# Patient Record
Sex: FEMALE | Race: WHITE | HISPANIC OR LATINO | Employment: FULL TIME | URBAN - METROPOLITAN AREA
[De-identification: names, ages, dates, MRNs, and addresses within clinical notes are randomized per-mention and may not be internally consistent; named-entity substitution may affect disease eponyms.]

---

## 2019-03-18 ENCOUNTER — OFFICE VISIT (OUTPATIENT)
Dept: URGENT CARE | Facility: PHYSICIAN GROUP | Age: 19
End: 2019-03-18
Payer: COMMERCIAL

## 2019-03-18 VITALS
SYSTOLIC BLOOD PRESSURE: 104 MMHG | TEMPERATURE: 98.6 F | HEIGHT: 65 IN | HEART RATE: 79 BPM | OXYGEN SATURATION: 98 % | BODY MASS INDEX: 20.16 KG/M2 | DIASTOLIC BLOOD PRESSURE: 68 MMHG | RESPIRATION RATE: 18 BRPM | WEIGHT: 121 LBS

## 2019-03-18 DIAGNOSIS — K59.00 CONSTIPATION, UNSPECIFIED CONSTIPATION TYPE: ICD-10-CM

## 2019-03-18 DIAGNOSIS — J11.1 INFLUENZA: ICD-10-CM

## 2019-03-18 DIAGNOSIS — R68.89 FLU-LIKE SYMPTOMS: ICD-10-CM

## 2019-03-18 LAB
FLUAV+FLUBV AG SPEC QL IA: NORMAL
INT CON NEG: NEGATIVE
INT CON POS: POSITIVE

## 2019-03-18 PROCEDURE — 99203 OFFICE O/P NEW LOW 30 MIN: CPT | Performed by: PHYSICIAN ASSISTANT

## 2019-03-18 PROCEDURE — 87804 INFLUENZA ASSAY W/OPTIC: CPT | Performed by: PHYSICIAN ASSISTANT

## 2019-03-18 RX ORDER — FLUTICASONE PROPIONATE 50 MCG
1 SPRAY, SUSPENSION (ML) NASAL DAILY
Qty: 16 G | Refills: 0 | Status: SHIPPED | OUTPATIENT
Start: 2019-03-18 | End: 2023-04-25

## 2019-03-18 RX ORDER — OSELTAMIVIR PHOSPHATE 75 MG/1
75 CAPSULE ORAL 2 TIMES DAILY
Qty: 10 CAP | Refills: 0 | Status: SHIPPED | OUTPATIENT
Start: 2019-03-18 | End: 2019-03-23

## 2019-03-18 ASSESSMENT — ENCOUNTER SYMPTOMS
NAUSEA: 0
SORE THROAT: 1
MYALGIAS: 1
COUGH: 1
ABDOMINAL PAIN: 1
FATIGUE: 1
CHILLS: 1
VOMITING: 0
FEVER: 1

## 2019-03-18 NOTE — PROGRESS NOTES
"Subjective:   Dinora Green is a 19 y.o. female who presents for Cough (congestion, sore throat, stomach pain, fever, headache, X 3 days )        5 AM last dose of ibuprofen.  LMP: 2/25/19 (approximately). She is not sexually active, denies chance that she could be pregnant.  Last BM: Friday - feels constipated, but has not taken any medications for this.        Influenza   This is a new problem. The current episode started in the past 7 days (3 days). The problem occurs constantly. The problem has been unchanged. Associated symptoms include abdominal pain (mild lower), chills, congestion, coughing, fatigue, a fever (subjective), myalgias and a sore throat. Pertinent negatives include no nausea or vomiting. Nothing aggravates the symptoms. She has tried NSAIDs for the symptoms. The treatment provided moderate relief.     Review of Systems   Constitutional: Positive for chills, fatigue and fever (subjective).   HENT: Positive for congestion and sore throat.    Respiratory: Positive for cough.    Gastrointestinal: Positive for abdominal pain (mild lower). Negative for nausea and vomiting.   Musculoskeletal: Positive for myalgias.       PMH:  has no past medical history on file.  MEDS:   Current Outpatient Prescriptions:   •  oseltamivir (TAMIFLU) 75 MG Cap, Take 1 Cap by mouth 2 times a day for 5 days., Disp: 10 Cap, Rfl: 0  •  fluticasone (FLONASE) 50 MCG/ACT nasal spray, Spray 1 Spray in nose every day., Disp: 16 g, Rfl: 0  ALLERGIES: No Known Allergies  SURGHX: No past surgical history on file.  SOCHX:  reports that she has never smoked. She has never used smokeless tobacco.  FH: Family history was reviewed, no pertinent findings to report   Objective:   /68   Pulse 79   Temp 37 °C (98.6 °F)   Resp 18   Ht 1.651 m (5' 5\")   Wt 54.9 kg (121 lb)   SpO2 98%   BMI 20.14 kg/m²   Physical Exam   Constitutional: She appears well-developed and well-nourished.  Non-toxic appearance. No distress.   HENT: "   Head: Normocephalic and atraumatic.   Right Ear: Tympanic membrane, external ear and ear canal normal.   Left Ear: Tympanic membrane, external ear and ear canal normal.   Nose: Mucosal edema and rhinorrhea present.   Mouth/Throat: Uvula is midline and mucous membranes are normal. Posterior oropharyngeal erythema present.   Significant nasal congestion.  Occasional dry cough.   Neck: Neck supple.   Cardiovascular: Normal rate, regular rhythm and normal heart sounds.  Exam reveals no gallop and no friction rub.    No murmur heard.  Pulmonary/Chest: Effort normal and breath sounds normal. No respiratory distress. She has no wheezes. She has no rales.   Abdominal: Soft. Bowel sounds are normal. There is no hepatosplenomegaly. There is tenderness (Very mild diffuse tenderness.). There is no rigidity, no rebound, no guarding, no CVA tenderness, no tenderness at McBurney's point and negative Castro's sign.   Neurological: She is alert.   Skin: Skin is warm and dry. Capillary refill takes less than 2 seconds.   Psychiatric: She has a normal mood and affect. Her speech is normal and behavior is normal. Judgment and thought content normal. Cognition and memory are normal.   Vitals reviewed.        Assessment/Plan:   1. Influenza  - oseltamivir (TAMIFLU) 75 MG Cap; Take 1 Cap by mouth 2 times a day for 5 days.  Dispense: 10 Cap; Refill: 0  - fluticasone (FLONASE) 50 MCG/ACT nasal spray; Spray 1 Spray in nose every day.  Dispense: 16 g; Refill: 0    2. Flu-like symptoms  - POCT Influenza A/B    3. Constipation, unspecified constipation type      1.  Pt presentation consistent with influenza, high prevalence in community currently, and clinical suspicion is high.    VSS, no dyspnea, no SOB, and lungs CTA on PE.  Goals of care include symptomatic control and prevention of lower respiratory spread. Signs of lower respiratory involvement discussed with pt.  Pt instructed to RTC if any of these are observed.     Drink plenty of  fluids and rest.  Use nasal saline TID to promote drainage.   Salt water gurgles to soothe sore throat.  Start OTC expectorant.  APAP for fever control, and NSAIDs for throat pain/headache relief prn.    If you fail to improve in 24 days or symptoms worsen/new symptoms develop, RTC for reevaluation.    If symptoms worsen or new symptoms develop to include uncontrolled fevers, dyspnea, SOB, or vomiting- pt was instructed to go to the ED for evaluation.      2.  Patient has a very mild generalized abdominal tenderness.  No nausea or vomiting.  Patient symptoms likely secondary to constipation, as she has not had a bowel movement in 3 days.  Patient started on polyethylene glycol.  I would like her to take 2 capfuls of MiraLAX twice daily for the next 7 days then take half a capful of MiraLAX twice daily for 7 days followed by half a capful of MiraLAX once a day for 7 days.  If patient fails to have a bowel movement in the next 1-2 days I would like her to return to clinic for reevaluation.  If patient develops nausea, vomiting, fevers, chills, worsening abdominal pain, blood in stools or other new symptoms I would like her to go to the emergency room for reevaluation.  I informed her that although constipation is most likely the cause of her symptoms it is possible that a different pathology is responsible for her symptoms but it has not fully expressed itself yet. That being said if she is getting worse or develop new symptoms she needs to be reevaluated.  Differential diagnosis, natural history, supportive care, and indications for immediate follow-up discussed.

## 2019-03-18 NOTE — LETTER
[facility] [place]  [address]  [place] [address]     March 18, 2019    Patient: Dinora Green   YOB: 2000   Date of Visit: 3/18/2019       To Whom It May Concern:    Dinora Green was seen and treated in our department on 3/18/2019.  Please excuse her from work on 3/18/19.    Sincerely,     Jerry Cheng

## 2019-08-21 ENCOUNTER — OCCUPATIONAL MEDICINE (OUTPATIENT)
Dept: URGENT CARE | Facility: PHYSICIAN GROUP | Age: 19
End: 2019-08-21
Payer: COMMERCIAL

## 2019-08-21 ENCOUNTER — APPOINTMENT (OUTPATIENT)
Dept: RADIOLOGY | Facility: IMAGING CENTER | Age: 19
End: 2019-08-21
Attending: NURSE PRACTITIONER
Payer: COMMERCIAL

## 2019-08-21 VITALS
TEMPERATURE: 98.6 F | OXYGEN SATURATION: 97 % | RESPIRATION RATE: 16 BRPM | SYSTOLIC BLOOD PRESSURE: 100 MMHG | BODY MASS INDEX: 21.83 KG/M2 | DIASTOLIC BLOOD PRESSURE: 68 MMHG | HEIGHT: 65 IN | HEART RATE: 74 BPM | WEIGHT: 131 LBS

## 2019-08-21 DIAGNOSIS — M79.672 ACUTE PAIN OF LEFT FOOT: ICD-10-CM

## 2019-08-21 DIAGNOSIS — S96.912A REPETITIVE STRAIN INJURY OF LEFT FOOT, INITIAL ENCOUNTER: ICD-10-CM
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
## 2019-08-21 PROCEDURE — 73630 X-RAY EXAM OF FOOT: CPT | Mod: TC,LT | Performed by: NURSE PRACTITIONER

## 2019-08-21 PROCEDURE — 99213 OFFICE O/P EST LOW 20 MIN: CPT | Performed by: NURSE PRACTITIONER

## 2019-08-21 NOTE — LETTER
"EMPLOYEE’S CLAIM FOR COMPENSATION/ REPORT OF INITIAL TREATMENT  FORM C-4    EMPLOYEE’S CLAIM - PROVIDE ALL INFORMATION REQUESTED   First Name  Dinora Last Name  Peter Birthdate                    2000                Sex  female Claim Number   Home Address  [address] Age  19 y.o. 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 (if applicable)  Going up and down the ladder    How did this injury or occupational disease occur? (Be specific an answer in detail. Use additional sheet if necessary)  Going up and down the ladder to get product   If you believe that you have an occupational disease, when did you first have knowledge of the disability and it relationship to your employment?  N/A Witnesses to the Accident  N/A      Nature of Injury or Occupational Disease  Sprain  Part(s) of Body Injured or Affected  Foot (L), N/A, N/A    I certify that the above is true and correct to the best of my knowledge and that I have provided this information in order to obtain the benefits of [place] inclusive or [place]).  I hereby authorize any physician, chiropractor, surgeon, practitioner, or other person, any hospital, including [facility] hospital, any medical service organization, any insurance company, or other institution or organization to release to each other, any medical or other information, including benefits paid or payable, pertinent to this injury or disease, except information relative to diagnosis, treatment and/or counseling for AIDS, psychological conditions, alcohol or controlled substances, for which I must give specific authorization.  A Photostat of this authorization shall be as valid as the original.     Date   Place   Employee’s Signature   THIS REPORT MUST BE COMPLETED AND MAILED WITHIN 3 WORKING DAYS OF TREATMENT   Place  [facility]  Name of Facility  [place]   Date  8/21/2019 Diagnosis  (S96.912A) Repetitive strain injury of left foot, initial encounter Is there evidence the injured employee was under the influence of alcohol and/or another controlled substance at the time of accident?   Hour  10:27 AM Description of Injury or Disease  The encounter diagnosis was Repetitive strain injury of left foot, initial encounter. No   Treatment  OTC NSAIDS, supportive foot wear, RICE, work restrictions, RTC in four days for re-eval.  Have you advised the patient to remain off work five days or more? No   X-Ray Findings  Negative   If Yes   From Date  To Date      From information given by the employee, together with medical evidence, can you directly connect this injury or occupational disease as job incurred?  Yes If No Full Duty  No Modified Duty  Yes   Is additional medical care by a physician indicated?  Yes If Modified Duty, Specify any Limitations / Restrictions  Per D-39   Do you know of any previous injury or disease contributing to this condition or occupational disease?                            No   Date  8/21/2019  "Print Doctor’s Name ROSA Hines I certify the employer’s copy of  this form was mailed on:   Address  [address] [place] [address]. [address] Insurer’s Use Only     [facility] Zip  [address]    Provider’s Tax ID Number  [id number] Telephone  Dept: [phone]        e-SUNDAY Chavez   e-Signature: Dr. Onel Wong, Medical Director Degree  MD        ORIGINAL-TREATING PHYSICIAN OR CHIROPRACTOR    PAGE 2-INSURER/TPA    PAGE 3-EMPLOYER    PAGE 4-EMPLOYEE             Form C-4 (rev10/07)              BRIEF DESCRIPTION OF RIGHTS AND BENEFITS  (Pursuant to [place] 616C.050)    Notice of Injury or Occupational Disease (Incident Report Form C-1): If an injury or occupational disease (OD) arises out of and in the  course of employment, you must provide written notice to your employer as soon as practicable, but no later than 7 days after the accident or  OD. Your employer shall maintain a sufficient supply of the required forms.    Claim for Compensation (Form C-4): If medical treatment is sought, the form C-4 is available at the place of initial treatment. A completed  \"Claim for Compensation\" (Form C-4) must be filed within 90 days after an accident or OD. The treating physician or chiropractor must,  within 3 working days after treatment, complete and mail to the employer, the employer's insurer and third-party , the Claim for  Compensation.    Medical Treatment: If you require medical treatment for your on-the-job injury or OD, you may be required to select a physician or  chiropractor from a list provided by your workers’ compensation insurer, if it has contracted with an Organization for Managed Care (MCO) or  Preferred Provider Organization (PPO) or providers of health care. If your employer has not entered into a contract with an MCO or PPO, you  may select a physician or chiropractor from the Panel of Physicians and Chiropractors. Any medical costs related to your industrial injury " or  OD will be paid by your insurer.    Temporary Total Disability (TTD): If your doctor has certified that you are unable to work for a period of at least 5 consecutive days, or 5  cumulative days in a 20-day period, or places restrictions on you that your employer does not accommodate, you may be entitled to TTD  compensation.    Temporary Partial Disability (TPD): If the wage you receive upon reemployment is less than the compensation for TTD to which you are  entitled, the insurer may be required to pay you TPD compensation to make up the difference. TPD can only be paid for a maximum of 24  months.    Permanent Partial Disability (PPD): When your medical condition is stable and there is an indication of a PPD as a result of your injury or  OD, within 30 days, your insurer must arrange for an evaluation by a rating physician or chiropractor to determine the degree of your PPD. The  amount of your PPD award depends on the date of injury, the results of the PPD evaluation and your age and wage.    Permanent Total Disability (PTD): If you are medically certified by a treating physician or chiropractor as permanently and totally disabled  and have been granted a PTD status by your insurer, you are entitled to receive monthly benefits not to exceed 66 2/3% of your average  monthly wage. The amount of your PTD payments is subject to reduction if you previously received a PPD award.    Vocational Rehabilitation Services: You may be eligible for vocational rehabilitation services if you are unable to return to the job due to a  permanent physical impairment or permanent restrictions as a result of your injury or occupational disease.    Transportation and Per Lesly Reimbursement: You may be eligible for travel expenses and per lesly associated with medical treatment.    Reopening: You may be able to reopen your claim if your condition worsens after claim closure.    Appeal Process: If you disagree with a written  determination issued by the insurer or the insurer does not respond to your request, you may  appeal to the Department of Administration, , by following the instructions contained in your determination letter. You must  appeal the determination within 70 days from the date of the determination letter at [address], [address], [place], [place]  [address], or [address], [address], [place], [place] [address]. If you disagree with the  decision, you may appeal to the  Department of Administration, . You must file your appeal within 30 days from the date of the  decision  letter at [address], [address], [place], [place] [address], or [address], [address], [place], [place] [address]. If you  disagree with a decision of an , you may file a petition for judicial review with the District Court. You must do so within 30  days of the Appeal Officer’s decision. You may be represented by an  at your own expense or you may contact the [facility] for possible  representation.    [place]  for Injured Workers (NAIW): If you disagree with a  decision, you may request that NAIW represent you  without charge at an  Hearing. For information regarding denial of benefits, you may contact the [facility] at: [address], [address], [place] [address], [phone], or [address], [place] [address], [phone]    To File a Complaint with the Division: If you wish to file a complaint with the  of the Division of Industrial Relations (DIR),  please contact the Workers’ Compensation Section, [address], [address], [place], [place] [address], telephone [phone]) [phone], or  [address], [address] [place], [place] [address], telephone [phone]) [phone].    For assistance with Workers’ Compensation Issues: you may contact the  Office of the Governor Consumer Health Assistance, [address] [place] [address], [address], [place] [address], Toll Free 1-491.662.5158, Web site: http://govcha.[facility] access hospital.nv., E-mail  Andreia@[facility] access [facility].nv.                                                                                                                                                                                                                                   __________________________________________________________________                                                                   _________________                Employee Name / Signature                                                                                                                                                       Date                                                                                                                                                                                                     D-2 (rev. 10/07)

## 2019-08-21 NOTE — LETTER
"   [facility] [place]  [address] [place] [address]. [address] - [place] [address]  Phone:  [phone] - Fax:  [phone]   [facility] Progress Report and Disability Certification  Date of Service: 8/21/2019   No Show:  No  Date / Time of Next Visit: 8/24/2019   Claim Information   Patient Name: Dinora Green  Claim Number:     Employer:   *** Date of Injury: 8/16/2019     Insurer / TPA: [employer] *** ID / SSN:     Occupation: Pulling/Pick *** Diagnosis: The encounter diagnosis was Repetitive strain injury of left foot, initial encounter.    Medical Information   Related to Industrial Injury? Yes ***   Subjective Complaints:  DOI 8/16/19: Patient works at a [employer], picking products. During her shift, she climbs ladders all day \"picking\" items. She was at work, climbing up a ladder, performing her normal duties, when she suddenly felt pain to her left foot. She did not feel any significant pop or twinge but did feel the pain. The pain is on dorsal aspect of her foot. She has had constant pain since, worse with ambulation. Admits to intermittent tingling sensation to her toes. Denies previous injuries to this foot. She has tried epsom salt soaks for pain relief. Does not have any other jobs or contributing factors.    Objective Findings: A/Ox4. NAD. Left foot: swelling noted to distal aspect of dorsal forefoot. There is tenderness to foot at distal 2nd/3rd metatarsals. N/V intact, FROM. Gait mildly antalgic. No erythema, warmth, ecchymosis.    Pre-Existing Condition(s): Denies    Assessment:   Initial Visit    Status: Additional Care Required  Permanent Disability:No    Plan: Medication  Comments:OTC NSAIDS, supportive foot wear, RICE, work restrictions, RTC in four days for re-eval.    Diagnostics: X-ray    Comments:  DX foot radiology reading \"Soft tissue swelling of the dorsum of the foot. No acute osseous abnormality.\"    Disability Information   Status: Released to " Restricted Duty    From:  2019  Through: 2019 Restrictions are: Temporary   Physical Restrictions   Sitting:    Standing:  < or = to 1 hr/day Stooping:    Bending:      Squatting:    Walking:  < or = to 2 hrs/day Climbin hrs/day Pushing:      Pulling:    Other:    Reaching Above Shoulder (L):   Reaching Above Shoulder (R):       Reaching Below Shoulder (L):    Reaching Below Shoulder (R):      Not to exceed Weight Limits   Carrying(hrs): 1 Weight Limit(lb): < or = to 10 pounds Lifting(hrs): 1 Weight  Limit(lb): < or = to 10 pounds   Comments:      Repetitive Actions   Hands: i.e. Fine Manipulations from Grasping:     Feet: i.e. Operating Foot Controls:     Driving / Operate Machinery:     Physician Name: ROSA Hines Physician Signature: SUNDAY Singh e-Signature: Dr. Onel Wong, Medical Director   Clinic Name / Location: [address]  [place] [address] Clinic Phone Number: Dept: [phone]   Appointment Time: 10:10 Am Visit Start Time: 10:27 AM   Check-In Time:  10:23 Am Visit Discharge Time:  ***   Original-Treating Physician or Chiropractor    Page 2-Insurer/TPA    Page 3-Employer    Page 4-Employee

## 2019-08-21 NOTE — PROGRESS NOTES
"  Chief Complaint   Patient presents with   • Foot Pain     top of Lt foot, X friday Wc       HISTORY OF PRESENT ILLNESS: Patient is a 19 y.o. female who presents to urgent care today with a work comp complaint of left foot pain. 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 She was at work, climbing up a ladder, performing her normal duties, when she suddenly felt pain to her left foot. She did not feel any significant pop or twinge but did feel the pain. The pain is on dorsal aspect of her foot. She has had constant pain since, worse with ambulation. Admits to intermittent tingling sensation to her toes. Denies previous injuries to this foot. She has tried epsom salt soaks for pain relief. Does not have any other jobs or contributing factors.       PMH: No pertinent past medical history to this problem  MEDS: Medications were reviewed in Epic  ALLERGIES: Allergies were reviewed in Epic  FH: No pertinent family history to this problem      ROS:  Review of Systems   Constitutional: Negative for fever, chills, weight loss, malaise, and fatigue.   HENT: Negative for ear pain, nosebleeds, congestion, sore throat and neck pain.    Eyes: Negative for vision changes.   Neuro: Negative for headache, sensory changes, weakness, seizure, LOC.   Cardiovascular: Negative for chest pain, palpitations, orthopnea and leg swelling.   Respiratory: Negative for cough, sputum production, shortness of breath and wheezing.   Gastrointestinal: Negative for abdominal pain, nausea, vomiting or diarrhea.   Genitourinary: Negative for dysuria, urgency and frequency.  Musculoskeletal: Positive for left foot pain. Negative for falls, neck pain, back pain, joint pain, myalgias.   Skin: Negative for rash, diaphoresis.     Exam:  /68   Pulse 74   Temp 37 °C (98.6 °F)   Resp 16   Ht 1.651 m (5' 5\")   Wt 59.4 kg (131 lb)   SpO2 97%   General: well-nourished, " "well-developed female in NAD  Head: normocephalic, atraumatic  Eyes: PERRLA, no conjunctival injection, acuity grossly intact, lids normal.  Ears: normal shape and symmetry, no tenderness, no discharge. External canals are without any significant edema or erythema. Gross auditory acuity is intact.  Nose: symmetrical without tenderness, no discharge.  Mouth/Throat: reasonable hygiene, no erythema, exudates or tonsillar enlargement.  Neck: no masses, range of motion within normal limits, no tracheal deviation. No obvious thyroid enlargement.   Lymph: no cervical adenopathy. No supraclavicular adenopathy.   Neuro: alert and oriented. Cranial nerves 1-12 grossly intact. No sensory deficit.   Cardiovascular: regular rate and rhythm. No edema.  Pulmonary: no distress. Chest is symmetrical with respiration, no wheezes, crackles, or rhonchi.   Musculoskeletal: no clubbing, appropriate muscle tone. Left foot: swelling noted to distal aspect of dorsal forefoot. There is tenderness to foot at distal 2nd/3rd metatarsals. N/V intact, FROM. Gait mildly antalgic. No erythema, warmth, ecchymosis.   Skin: warm, dry, intact, no clubbing, no cyanosis, no rashes.   Psych: appropriate mood, affect, judgement.         Assessment/Plan:  1. Repetitive strain injury of left foot, initial encounter  DX-FOOT-COMPLETE 3+ LEFT       DX foot reviewed by myself, radiology reading \"Soft tissue swelling of the dorsum of the foot. No acute osseous abnormality.\"      X-ray negative, suspect repetitive strain injury. OTC NSAIDS, supportive foot wear, RICE, work restrictions, RTC in four days for re-eval.  Supportive care, differential diagnoses, and indications for immediate follow-up discussed with patient.   Pathogenesis of diagnosis discussed including typical length and natural progression.   Instructed to return to clinic or nearest emergency department sooner for any change in condition, further concerns, or worsening of symptoms.  Patient " states understanding of the plan of care and discharge instructions.          Please note that this dictation was created using voice recognition software. I have made every reasonable attempt to correct obvious errors, but I expect that there are errors of grammar and possibly content that I did not discover before finalizing the note.      TONYA Hines.

## 2019-08-21 NOTE — LETTER
"   [facility] [address] - [place] [address]  Phone:  [phone] - Fax:  [phone]   [facility] Progress Report and Disability Certification  Date of Service: 8/21/2019   No Show:  No  Date / Time of Next Visit: 8/25/2019@9:20AM   Claim Information   Patient Name: Dinora Green  Claim Number:     Employer:   [employer] Date of Injury: 8/16/2019     Insurer / TPA: [employer]  ID / SSN:     Occupation: Pulling/Pick  Diagnosis: The encounter diagnosis was Repetitive strain injury of left foot, initial encounter.    Medical Information   Related to Industrial Injury? Yes    Subjective Complaints:  DOI 8/16/19: Patient works at a [employer], picking products. During her shift, she climbs ladders all day \"picking\" items. She was at work, climbing up a ladder, performing her normal duties, when she suddenly felt pain to her left foot. She did not feel any significant pop or twinge but did feel the pain. The pain is on dorsal aspect of her foot. She has had constant pain since, worse with ambulation. Admits to intermittent tingling sensation to her toes. Denies previous injuries to this foot. She has tried epsom salt soaks for pain relief. Does not have any other jobs or contributing factors.    Objective Findings: A/Ox4. NAD. Left foot: swelling noted to distal aspect of dorsal forefoot. There is tenderness to foot at distal 2nd/3rd metatarsals. N/V intact, FROM. Gait mildly antalgic. No erythema, warmth, ecchymosis.    Pre-Existing Condition(s): Denies    Assessment:   Initial Visit    Status: Additional Care Required  Permanent Disability:No    Plan: Medication  Comments:OTC NSAIDS, supportive foot wear, RICE, work restrictions, RTC in four days for re-eval.    Diagnostics: X-ray    Comments:  DX foot radiology reading \"Soft tissue swelling of the dorsum of the foot. No acute osseous abnormality.\"    Disability Information   Status: Released to " Restricted Duty    From:  2019  Through: 2019 Restrictions are: Temporary   Physical Restrictions   Sitting:    Standing:  < or = to 1 hr/day Stooping:    Bending:      Squatting:    Walking:  < or = to 2 hrs/day Climbin hrs/day Pushing:      Pulling:    Other:    Reaching Above Shoulder (L):   Reaching Above Shoulder (R):       Reaching Below Shoulder (L):    Reaching Below Shoulder (R):      Not to exceed Weight Limits   Carrying(hrs): 1 Weight Limit(lb): < or = to 10 pounds Lifting(hrs): 1 Weight  Limit(lb): < or = to 10 pounds   Comments:      Repetitive Actions   Hands: i.e. Fine Manipulations from Grasping:     Feet: i.e. Operating Foot Controls:     Driving / Operate Machinery:     Physician Name: ROSA Hines Physician Signature: SUNDAY Singh e-Signature: Dr. Onel Wong, Medical Director   Clinic Name / Location: [address]  [place] [address] Clinic Phone Number: Dept: [phone]   Appointment Time: 10:10 Am Visit Start Time: 10:27 AM   Check-In Time:  10:23 Am Visit Discharge Time:  11:34AM   Original-Treating Physician or Chiropractor    Page 2-Insurer/TPA    Page 3-Employer    Page 4-Employee

## 2019-08-25 ENCOUNTER — OCCUPATIONAL MEDICINE (OUTPATIENT)
Dept: URGENT CARE | Facility: PHYSICIAN GROUP | Age: 19
End: 2019-08-25
Payer: COMMERCIAL

## 2019-08-25 VITALS
BODY MASS INDEX: 21.83 KG/M2 | SYSTOLIC BLOOD PRESSURE: 98 MMHG | OXYGEN SATURATION: 100 % | HEIGHT: 65 IN | RESPIRATION RATE: 16 BRPM | WEIGHT: 131 LBS | HEART RATE: 85 BPM | DIASTOLIC BLOOD PRESSURE: 62 MMHG | TEMPERATURE: 98.6 F

## 2019-08-25 DIAGNOSIS — S93.602A SPRAIN OF LEFT FOOT, INITIAL ENCOUNTER: ICD-10-CM

## 2019-08-25 PROCEDURE — 99214 OFFICE O/P EST MOD 30 MIN: CPT | Performed by: PHYSICIAN ASSISTANT

## 2019-08-25 ASSESSMENT — ENCOUNTER SYMPTOMS
VOMITING: 0
SENSORY CHANGE: 0
SPEECH CHANGE: 0
TREMORS: 0
SHORTNESS OF BREATH: 0
LOSS OF CONSCIOUSNESS: 0
DOUBLE VISION: 0
PALPITATIONS: 0
BLURRED VISION: 0
ORTHOPNEA: 0
ABDOMINAL PAIN: 0
FOCAL WEAKNESS: 0
WEAKNESS: 0
DIZZINESS: 0
NAUSEA: 0
SEIZURES: 0
DIARRHEA: 0
COUGH: 0
TINGLING: 0
CHILLS: 0
HEADACHES: 0
CLAUDICATION: 0
FEVER: 0

## 2019-08-25 NOTE — LETTER
"   [facility] [place]  [address] [place] [address]. [address] - [place] [address]  Phone:  [phone] - Fax:  [phone]   [facility] Progress Report and Disability Certification  Date of Service: 8/25/2019   No Show:  No  Date / Time of Next Visit: 9/1/2019 @ 9:20 AM   Claim Information   Patient Name: Dinora Green  Claim Number:     Employer:  [employer] Date of Injury: 8/16/2019     Insurer / TPA: [employer]  ID / SSN:     Occupation: Pulling/Pick  Diagnosis: There were no encounter diagnoses.    Medical Information   Related to Industrial Injury? Yes    Subjective Complaints:  DOI 8/16/19: Visit #2.  Patient works at a [employer], picking products. During her shift, she climbs ladders all day \"picking\" items. She was at work, climbing up a ladder, performing her normal duties, when she suddenly felt pain to her left foot. She did not feel any significant pop or twinge but did feel the pain.   X rays negative on previous visit.  She states she has had minimal improvement with NSAIDS and icing.   Objective Findings: A/Ox4. NAD. Left foot: swelling noted to distal aspect of dorsal forefoot. There is tenderness to foot at distal 2nd/3rd metatarsals. N/V intact, FROM. Gait mildly antalgic. No erythema, warmth, ecchymosis.    Pre-Existing Condition(s):     Assessment:   Condition Same    Status: Additional Care Required  Permanent Disability:Yes    Plan:      Diagnostics:      Comments:       Disability Information   Status: Released to Restricted Duty    From:  8/25/2019  Through: 9/1/2019 Restrictions are: Temporary   Physical Restrictions   Sitting:    Standing:  < or = to 1 hr/day Stooping:    Bending:      Squatting:    Walking:  < or = to 1 hr/day Climbing:  < or = to 1 hr/day Pushing:      Pulling:    Other:    Reaching Above Shoulder (L):   Reaching Above Shoulder (R):       Reaching Below Shoulder (L):    Reaching Below Shoulder (R):      Not to exceed Weight Limits   "   Carrying(hrs):   Weight Limit(lb): < or = to 10 pounds Lifting(hrs):   Weight  Limit(lb): < or = to 10 pounds   Comments: Recommend foot wrap and continue icing, elevating and NSAIDS.  Follow up in 7 days for recheck.    Repetitive Actions   Hands: i.e. Fine Manipulations from Grasping:     Feet: i.e. Operating Foot Controls:     Driving / Operate Machinery:     Physician Name: Bryson Booth P.A.-C. Physician Signature: BRYSON Boone P.A.-C. e-Signature: Dr. Onel Wong, Medical Director   Clinic Name / Location: [address]  [place] [address] Clinic Phone Number: Dept: [phone]   Appointment Time: 9:15 Am Visit Start Time: 9:15 AM   Check-In Time:  9:11 Am Visit Discharge Time: 9:43 AM   Original-Treating Physician or Chiropractor    Page 2-Insurer/TPA    Page 3-Employer    Page 4-Employee

## 2019-08-25 NOTE — PROGRESS NOTES
"Subjective:      Dinora Green is a 19 y.o. female who presents with Foot Pain (Lt foot, sprain Wc/fv )          HPI  DOI 8/16/19: Visit #2.  Patient works at a warehouse, picking products. During her shift, she climbs ladders all day \"picking\" items. She was at work, climbing up a ladder, performing her normal duties, when she suddenly felt pain to her left foot. She did not feel any significant pop or twinge but did feel the pain.   X rays negative on previous visit.  She states she has had minimal improvement with NSAIDS and icing.     Review of Systems   Constitutional: Negative for chills and fever.   Eyes: Negative for blurred vision and double vision.   Respiratory: Negative for cough and shortness of breath.    Cardiovascular: Negative for chest pain, palpitations, orthopnea, claudication and leg swelling.   Gastrointestinal: Negative for abdominal pain, diarrhea, nausea and vomiting.   Musculoskeletal:        Left foot pain.   Skin: Negative for rash.   Neurological: Negative for dizziness, tingling, tremors, sensory change, speech change, focal weakness, seizures, loss of consciousness, weakness and headaches.   All other systems reviewed and are negative.    Pt PMH, SocHx, SurgHx, FamHx, Drug allergies and medications reviewed with pt/EPIC.  Family history reviewed, it is not pertinent to this complaint.       Objective:     BP (!) 98/62   Pulse 85   Temp 37 °C (98.6 °F)   Resp 16   Ht 1.651 m (5' 5\")   Wt 59.4 kg (131 lb)   SpO2 100%   BMI 21.80 kg/m²      Physical Exam    A/Ox4. NAD. Left foot: swelling noted to distal aspect of dorsal forefoot. There is tenderness to foot at distal 2nd/3rd metatarsals. N/V intact, FROM. Gait mildly antalgic. No erythema, warmth, ecchymosis.        Assessment/Plan:     1. Sprain of left foot, initial encounter  - D39- same work restriction.  Try wrap and elevation.  - Follow up in 7 days    "

## 2019-09-01 ENCOUNTER — OCCUPATIONAL MEDICINE (OUTPATIENT)
Dept: URGENT CARE | Facility: PHYSICIAN GROUP | Age: 19
End: 2019-09-01
Payer: COMMERCIAL

## 2019-09-01 VITALS
DIASTOLIC BLOOD PRESSURE: 60 MMHG | WEIGHT: 131 LBS | HEIGHT: 65 IN | BODY MASS INDEX: 21.83 KG/M2 | RESPIRATION RATE: 16 BRPM | OXYGEN SATURATION: 97 % | SYSTOLIC BLOOD PRESSURE: 100 MMHG | TEMPERATURE: 98.6 F | HEART RATE: 72 BPM
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
## 2019-09-01 DIAGNOSIS — S96.912D: ICD-10-CM

## 2019-09-01 PROCEDURE — 99213 OFFICE O/P EST LOW 20 MIN: CPT | Performed by: NURSE PRACTITIONER

## 2019-09-01 NOTE — LETTER
"   [facility] [place]  [address] [place] [address]. [address] - [place] [address]  Phone:  [phone] - Fax:  [phone]   [facility] Progress Report and Disability Certification  Date of Service: 9/1/2019   No Show:  No  Date / Time of Next Visit: 9/8/2019 @ 9:20 AM   Claim Information   Patient Name: Dinora Green  Claim Number:     Employer:  [employer] Date of Injury: 8/16/2019     Insurer / TPA: [employer]  ID / SSN:     Occupation: Pulling/Pick  Diagnosis: The encounter diagnosis was Repetitive strain injury of left foot, subsequent encounter.    Medical Information   Related to Industrial Injury? Yes    Subjective Complaints:  DOI 8/16/19: Visit #3  Patient works at a [employer], picking products. During her shift, she climbs ladders all day \"picking\" items. She was at work, climbing up a ladder, performing her normal duties, when she suddenly felt pain to her left foot. She did not feel any significant pop or twinge but did feel the pain.   X rays negative on initial visit.  She states she has had improvement with work restrains, wrapping, NSAIDS and icing. Overall, she admits to 70% improvement.    Objective Findings: A/Ox4. NAD. Left foot: swelling noted to distal aspect of dorsal forefoot. There is mild tenderness to foot at distal 2nd/3rd metatarsals. N/V intact, FROM. Gait normal. No erythema, warmth, ecchymosis.    Pre-Existing Condition(s): Denies    Assessment:   Condition Improved    Status: Additional Care Required  Permanent Disability:No    Plan: Medication  Comments:OTC NSAIDS, wrap, RICE, work restrictions, supportive shoes, RTC in one week     Diagnostics: X-ray  Comments:Initial x-ray negative    Comments:       Disability Information   Status: Released to Restricted Duty    From:  9/1/2019  Through: 9/8/2019 Restrictions are: Temporary   Physical Restrictions   Sitting:    Standing:  < or = to 2 hrs/day Stooping:    Bending:      Squatting:    Walking:  < " or = to 2 hrs/day Climbing:  < or = to 1 hr/day Pushing:      Pulling:    Other:    Reaching Above Shoulder (L):   Reaching Above Shoulder (R):       Reaching Below Shoulder (L):    Reaching Below Shoulder (R):      Not to exceed Weight Limits   Carrying(hrs):   Weight Limit(lb): < or = to 10 pounds Lifting(hrs):   Weight  Limit(lb): < or = to 10 pounds   Comments:      Repetitive Actions   Hands: i.e. Fine Manipulations from Grasping:     Feet: i.e. Operating Foot Controls:     Driving / Operate Machinery:     Physician Name: ROSA Hines Physician Signature: SUNDAY Singh e-Signature: Dr. Onel Wong, Medical Director   Clinic Name / Location: [address]  [place] [address] Clinic Phone Number: Dept: [phone]   Appointment Time: 9:20 Am Visit Start Time: 9:28 AM   Check-In Time:  9:25 Am Visit Discharge Time: 9:59 AM   Original-Treating Physician or Chiropractor    Page 2-Insurer/TPA    Page 3-Employer    Page 4-Employee

## 2019-09-01 NOTE — PROGRESS NOTES
"  Chief Complaint   Patient presents with   • Foot Pain     WC/fv        HISTORY OF PRESENT ILLNESS: Patient is a 19 y.o. female who presents to urgent care today with a work comp follow up. 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 She was at work, climbing up a ladder, performing her normal duties, when she suddenly felt pain to her left foot. She did not feel any significant pop or twinge but did feel the pain.   X rays negative on initial visit.  She states she has had improvement with work restrains, wrapping, NSAIDS and icing. Overall, she admits to 70% improvement.       PMH: No pertinent past medical history to this problem  MEDS: Medications were reviewed in Epic  ALLERGIES: Allergies were reviewed in Epic  FH: No pertinent family history to this problem      ROS:  Review of Systems   Constitutional: Negative for fever, chills, weight loss, malaise, and fatigue.   HENT: Negative for ear pain, nosebleeds, congestion, sore throat and neck pain.    Eyes: Negative for vision changes.   Neuro: Negative for headache, sensory changes, weakness, seizure, LOC.   Cardiovascular: Negative for chest pain, palpitations, orthopnea and leg swelling.   Respiratory: Negative for cough, sputum production, shortness of breath and wheezing.   Gastrointestinal: Negative for abdominal pain, nausea, vomiting or diarrhea.   Musculoskeletal: Positive for left foot pain, swelling. Negative for falls, neck pain, back pain, myalgias.   Skin: Negative for rash, diaphoresis.     Exam:  /60   Pulse 72   Temp 37 °C (98.6 °F)   Resp 16   Ht 1.651 m (5' 5\")   Wt 59.4 kg (131 lb)   SpO2 97%   General: well-nourished, well-developed female in NAD  Head: normocephalic, atraumatic  Eyes: PERRLA, no conjunctival injection, acuity grossly intact, lids normal.  Ears: normal shape and symmetry, no tenderness, no discharge. External canals are " without any significant edema or erythema. Gross auditory acuity is intact.  Nose: symmetrical without tenderness, no discharge.  Mouth/Throat: reasonable hygiene, no erythema, exudates or tonsillar enlargement.  Neck: no masses, range of motion within normal limits, no tracheal deviation. No obvious thyroid enlargement.   Lymph: no cervical adenopathy. No supraclavicular adenopathy.   Neuro: alert and oriented. Cranial nerves 1-12 grossly intact. No sensory deficit.   Cardiovascular: regular rate and rhythm. No edema.  Pulmonary: no distress. Chest is symmetrical with respiration, no wheezes, crackles, or rhonchi.   Musculoskeletal: no clubbing, appropriate muscle tone, gait is stable. Left foot: swelling noted to distal aspect of dorsal forefoot. There is mild tenderness to foot at distal 2nd/3rd metatarsals. N/V intact, FROM. No erythema, warmth, ecchymosis.   Skin: warm, dry, intact, no clubbing, no cyanosis, no rashes.   Psych: appropriate mood, affect, judgement.         Assessment/Plan:  1. Repetitive strain injury of left foot, subsequent encounter         OTC NSAIDS, wrap, RICE, work restrictions, supportive shoes, RTC in one week   Supportive care, differential diagnoses, and indications for immediate follow-up discussed with patient.   Pathogenesis of diagnosis discussed including typical length and natural progression.   Instructed to return to clinic or nearest emergency department sooner for any change in condition, further concerns, or worsening of symptoms.  Patient states understanding of the plan of care and discharge instructions.          Please note that this dictation was created using voice recognition software. I have made every reasonable attempt to correct obvious errors, but I expect that there are errors of grammar and possibly content that I did not discover before finalizing the note.      TONYA Hines.

## 2019-09-08 ENCOUNTER — OCCUPATIONAL MEDICINE (OUTPATIENT)
Dept: URGENT CARE | Facility: PHYSICIAN GROUP | Age: 19
End: 2019-09-08
Payer: COMMERCIAL

## 2019-09-08 VITALS
BODY MASS INDEX: 21.83 KG/M2 | HEART RATE: 70 BPM | OXYGEN SATURATION: 98 % | DIASTOLIC BLOOD PRESSURE: 68 MMHG | TEMPERATURE: 98 F | SYSTOLIC BLOOD PRESSURE: 108 MMHG | HEIGHT: 65 IN | WEIGHT: 131 LBS | RESPIRATION RATE: 18 BRPM
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
## 2019-09-08 DIAGNOSIS — S96.912D: ICD-10-CM

## 2019-09-08 PROCEDURE — 99213 OFFICE O/P EST LOW 20 MIN: CPT | Performed by: PHYSICIAN ASSISTANT

## 2019-09-08 ASSESSMENT — ENCOUNTER SYMPTOMS
FEVER: 0
COUGH: 0
HEADACHES: 0

## 2019-09-08 NOTE — LETTER
"   [facility] [address] - [place] [address]  Phone:  [phone] - Fax:  [phone]   [facility] Progress Report and Disability Certification  Date of Service: 9/8/2019   No Show:  No  Date / Time of Next Visit: 9/15/2019 @ 9:20 AM    Claim Information   Patient Name: Dinora Green  Claim Number:     Employer:   [employer] Date of Injury: 8/16/2019     Insurer / TPA: [employer]  ID / SSN:     Occupation: Pulling/Pick  Diagnosis: The encounter diagnosis was Repetitive strain injury of left foot, subsequent encounter.    Medical Information   Related to Industrial Injury? Yes    Subjective Complaints:  The patient presents to clinic for Workman's Comp follow-up.    DOI: 8/16/19 - The patient works at a [employer], picking products. The patient states during her shift, she climbs ladders all day, \"picking items\". The patient was at work, climbing a ladder, performing her normal duties when she suddenly felt pain to her left foot.     F/U #4 - Today, the patient states her symptoms have improved. She reports slight pain to her left foot, but states she is able to walk without increasing pain. The patient also states the swelling to her left foot has resolved. The patient is no longer requiring NSAIDs for her symptoms. She has been wrapping her left foot and applying ice. The patient is tolerating her light duty work restrictions without difficulty. The patient denies numbness, tingling, or weakness to her foot.      Objective Findings: Left Foot:  Mild swelling to the dorsal aspect of the left foot. No tenderness to palpation. No ecchymosis. No erythema. No increased warmth.   ROM intact - the patient demonstrates full active ROM  Neurovascular intact  Strength 5/5  Normal gait    Pre-Existing Condition(s):     Assessment:   Condition Improved    Status: Additional Care Required  Permanent Disability:No    Plan:      Diagnostics:      Comments:  " Plan:  Light Duty Work Restrictions - D39 provided  OTC NSAIDs for pain/discomfort  RICE   Follow-up in 1 week for reassessment  Return to clinic sooner if symptoms worsen, or if the patient should develop worsening/increasing left foot pain, swellin  g, redness or warmth to the affected area, numbness, tingling, or weakness to her left foot, decreased range of motion, difficulty walking, fever/chills, and/or any concerning symptoms.      Disability Information   Status: Released to Restricted Duty    From:  9/8/2019  Through: 9/15/2019 Restrictions are: Temporary   Physical Restrictions   Sitting:    Standing:  < or = to 6 hrs/day Stooping:    Bending:      Squatting:    Walking:  < or = to 6 hrs/day Climbing:  < or = to 1 hr/day Pushing:      Pulling:    Other:    Reaching Above Shoulder (L):   Reaching Above Shoulder (R):       Reaching Below Shoulder (L):    Reaching Below Shoulder (R):      Not to exceed Weight Limits   Carrying(hrs):   Weight Limit(lb): < or = to 25 pounds Lifting(hrs):   Weight  Limit(lb): < or = to 25 pounds   Comments:      Repetitive Actions   Hands: i.e. Fine Manipulations from Grasping:     Feet: i.e. Operating Foot Controls:     Driving / Operate Machinery:     Physician Name: Judy Rashid P.A.-C. Physician Signature: JUDY Aaron P.A.-C. e-Signature: Dr. Onel Wong, Medical Director   Clinic Name / Location: [address]  [place] [address] Clinic Phone Number: Dept: [phone]   Appointment Time: 9:20 Am Visit Start Time: 9:25 AM   Check-In Time:  9:20 Am Visit Discharge Time:  9:47AM    Original-Treating Physician or Chiropractor    Page 2-Insurer/TPA    Page 3-Employer    Page 4-Employee

## 2019-09-08 NOTE — PROGRESS NOTES
"Subjective:      Dinora Green is a 19 y.o. female who presents with Follow-Up (DOI 8/16/19 injured her left foot. patient is feeling a lot better. shes able to walk on it more. )          HPI    The patient presents to clinic for Workman's Comp follow-up.    DOI: 8/16/19 - The patient works at a [employer], picking products. The patient states during her shift, she climbs ladders all day, \"picking items\". The patient was at work, climbing a ladder, performing her normal duties when she suddenly felt pain to her left foot.     F/U #4 - Today, the patient states her symptoms have improved. She reports slight pain to her left foot, but states she is able to walk without increasing pain. The patient also states the swelling to her left foot has resolved. The patient is no longer requiring NSAIDs for her symptoms. She has been wrapping her left foot and applying ice. The patient is tolerating her light duty work restrictions without difficulty. The patient denies numbness, tingling, or weakness to her foot.     PMH: Reviewed in Epic, no pertinent findings.   MEDS: Reviewed in Epic.   ALLERGIES: No Known Allergies  SURGHX: History reviewed. No pertinent surgical history.  SOCHX: Reviewed in Epic.   FH: Family history was reviewed, no pertinent findings to report      Review of Systems   Constitutional: Negative for fever.   HENT: Negative for congestion.    Respiratory: Negative for cough.    Musculoskeletal: Positive for joint pain.   Neurological: Negative for headaches.          Objective:     /68 (BP Location: Left arm, Patient Position: Sitting, BP Cuff Size: Adult)   Pulse 70   Temp 36.7 °C (98 °F) (Temporal)   Resp 18   Ht 1.651 m (5' 5\")   Wt 59.4 kg (131 lb)   SpO2 98%   BMI 21.80 kg/m²      Physical Exam   Constitutional: She is oriented to person, place, and time. She appears well-developed and well-nourished. No distress.   HENT:   Head: Normocephalic and atraumatic.   Right Ear: External " ear normal.   Left Ear: External ear normal.   Nose: Nose normal.   Eyes: Conjunctivae and EOM are normal.   Neck: Normal range of motion. Neck supple.   Cardiovascular: Normal rate.   Pulmonary/Chest: Effort normal.   Musculoskeletal:        Feet:    Left Foot:  Mild swelling to the dorsal aspect of the left foot. No tenderness to palpation. No ecchymosis. No erythema. No increased warmth.   ROM intact - the patient demonstrates full active ROM  Neurovascular intact  Strength 5/5  Normal gait   Neurological: She is alert and oriented to person, place, and time.   Skin: Skin is warm and dry.               Assessment/Plan:     1. Repetitive strain injury of left foot, subsequent encounter    Plan:  Light Duty Work Restrictions - D39 provided  OTC NSAIDs for pain/discomfort  RICE   Follow-up in 1 week for reassessment  Return to clinic sooner if symptoms worsen, or if the patient should develop worsening/increasing left foot pain, swelling, redness or warmth to the affected area, numbness, tingling, or weakness to her left foot, decreased range of motion, difficulty walking, fever/chills, and/or any concerning symptoms.    Discussed plan with the patient, and she agrees to the above.

## 2019-09-15 ENCOUNTER — OCCUPATIONAL MEDICINE (OUTPATIENT)
Dept: URGENT CARE | Facility: PHYSICIAN GROUP | Age: 19
End: 2019-09-15
Payer: COMMERCIAL

## 2019-09-15 VITALS
OXYGEN SATURATION: 98 % | WEIGHT: 131 LBS | HEIGHT: 65 IN | SYSTOLIC BLOOD PRESSURE: 100 MMHG | BODY MASS INDEX: 21.83 KG/M2 | HEART RATE: 78 BPM | RESPIRATION RATE: 18 BRPM | TEMPERATURE: 98 F | DIASTOLIC BLOOD PRESSURE: 60 MMHG

## 2019-09-15 DIAGNOSIS — S93.602D SPRAIN OF LEFT FOOT, SUBSEQUENT ENCOUNTER: ICD-10-CM

## 2019-09-15 PROCEDURE — 99214 OFFICE O/P EST MOD 30 MIN: CPT | Performed by: PHYSICIAN ASSISTANT

## 2019-09-15 ASSESSMENT — ENCOUNTER SYMPTOMS
CLAUDICATION: 0
SPEECH CHANGE: 0
COUGH: 0
CHILLS: 0
SENSORY CHANGE: 0
DIARRHEA: 0
FOCAL WEAKNESS: 0
BLURRED VISION: 0
FEVER: 0
VOMITING: 0
DOUBLE VISION: 0
TREMORS: 0
LOSS OF CONSCIOUSNESS: 0
HEADACHES: 0
DIZZINESS: 0
SEIZURES: 0
NAUSEA: 0
SHORTNESS OF BREATH: 0
ORTHOPNEA: 0
WEAKNESS: 0
ABDOMINAL PAIN: 0
TINGLING: 0
PALPITATIONS: 0

## 2019-09-15 NOTE — PROGRESS NOTES
"Subjective:      Dinora Green is a 19 y.o. female who presents with Work-Related Injury (left foot injury. DOI 8/21/19. patient states that she is doing better. no pain )          HPI  DOI: 8/16/19 - The patient works at a warehouse, picking products. The patient states during her shift, she climbs ladders all day, \"picking items\". The patient was at work, climbing a ladder, performing her normal duties when she suddenly felt pain to her left foot.      F/U #5 - Today, the patient states her symptoms have improved. She reports no pain to her left foot.  The patient is no longer requiring NSAIDs for her symptoms. She has been wrapping her left foot and applying ice. The patient is tolerating her light duty work restrictions without difficulty. The patient denies numbness, tingling, or weakness to her foot.      Review of Systems   Constitutional: Negative for chills and fever.   Eyes: Negative for blurred vision and double vision.   Respiratory: Negative for cough and shortness of breath.    Cardiovascular: Negative for chest pain, palpitations, orthopnea, claudication and leg swelling.   Gastrointestinal: Negative for abdominal pain, diarrhea, nausea and vomiting.   Skin: Negative for rash.   Neurological: Negative for dizziness, tingling, tremors, sensory change, speech change, focal weakness, seizures, loss of consciousness, weakness and headaches.   All other systems reviewed and are negative.    Pt PMH, SocHx, SurgHx, FamHx, Drug allergies and medications reviewed with pt/EPIC.  Family history reviewed, it is not pertinent to this complaint.         Objective:     Blood Pressure 100/60 (BP Location: Right arm, Patient Position: Sitting, BP Cuff Size: Adult)   Pulse 78   Temperature 36.7 °C (98 °F) (Temporal)   Respiration 18   Height 1.651 m (5' 5\")   Weight 59.4 kg (131 lb)   Oxygen Saturation 98%   Body Mass Index 21.80 kg/m²      Physical Exam    Constitutional: Pt is oriented to person, " place, and time.  Appears well-developed and well-nourished. No distress.   HENT:   Mouth/Throat: Oropharynx is clear and moist.   Eyes: Conjunctivae are normal.   Cardiovascular: Normal rate.    Pulmonary/Chest: Effort normal.   Musculoskeletal: No PTP/Full ROM  Neurological: Pt is alert and oriented to person, place, and time. Coordination normal.   Skin: Skin is warm. Pt is not diaphoretic. No erythema.   Psychiatric: Pt has a normal mood and affect.  Behavior is normal.          Assessment/Plan:     1. Sprain of left foot, subsequent encounter  - Discharged/MMI

## 2019-09-15 NOTE — LETTER
"   [facility] [address] - [place] [address]  Phone:  [phone] - Fax:  [phone]   [facility] Progress Report and Disability Certification  Date of Service: 9/15/2019   No Show:  No  Date / Time of Next Visit:     Claim Information   Patient Name: Dinora Green  Claim Number:     Employer:   [employer] Date of Injury: 8/16/2019     Insurer / TPA: [employer]  ID / SSN:     Occupation: Pulling/Pick  Diagnosis: The encounter diagnosis was Sprain of left foot, subsequent encounter.    Medical Information   Related to Industrial Injury? Yes    Subjective Complaints:  DOI: 8/16/19 - The patient works at a [employer], picking products. The patient states during her shift, she climbs ladders all day, \"picking items\". The patient was at work, climbing a ladder, performing her normal duties when she suddenly felt pain to her left foot.      F/U #5 - Today, the patient states her symptoms have improved. She reports no pain to her left foot.  The patient is no longer requiring NSAIDs for her symptoms. She has been wrapping her left foot and applying ice. The patient is tolerating her light duty work restrictions without difficulty. The patient denies numbness, tingling, or weakness to her foot.    Objective Findings: Constitutional: Pt is oriented to person, place, and time.  Appears well-developed and well-nourished. No distress.   HENT:   Mouth/Throat: Oropharynx is clear and moist.   Eyes: Conjunctivae are normal.   Cardiovascular: Normal rate.    Pulmonary/Chest: Effort normal.   Musculoskeletal: No PTP/Full ROM  Neurological: Pt is alert and oriented to person, place, and time. Coordination normal.   Skin: Skin is warm. Pt is not diaphoretic. No erythema.   Psychiatric: Pt has a normal mood and affect.  Behavior is normal.      Pre-Existing Condition(s):     Assessment:   Initial Visit    Status: Discharged /  MMI  Permanent Disability:No    Plan:  "     Diagnostics:      Comments:       Disability Information   Status: Released to Full Duty    From:     Through:   Restrictions are:     Physical Restrictions   Sitting:    Standing:    Stooping:    Bending:      Squatting:    Walking:    Climbing:    Pushing:      Pulling:    Other:    Reaching Above Shoulder (L):   Reaching Above Shoulder (R):       Reaching Below Shoulder (L):    Reaching Below Shoulder (R):      Not to exceed Weight Limits   Carrying(hrs):   Weight Limit(lb):   Lifting(hrs):   Weight  Limit(lb):     Comments:      Repetitive Actions   Hands: i.e. Fine Manipulations from Grasping:     Feet: i.e. Operating Foot Controls:     Driving / Operate Machinery:     Physician Name: Bryson Booth P.A.-C. Physician Signature: BRYSON Boone P.A.-C. e-Signature: Dr. Onel Wong, Medical Director   Clinic Name / Location: [address]  [place] [address] Clinic Phone Number: Dept: [phone]   Appointment Time: 9:20 Am Visit Start Time: 9:18 AM   Check-In Time:  9:16 Am Visit Discharge Time:  9:48 PM    Original-Treating Physician or Chiropractor    Page 2-Insurer/TPA    Page 3-Employer    Page 4-Employee

## 2020-01-31 ENCOUNTER — APPOINTMENT (OUTPATIENT)
Dept: URGENT CARE | Facility: PHYSICIAN GROUP | Age: 20
End: 2020-01-31
Payer: COMMERCIAL

## 2020-01-31 ENCOUNTER — HOSPITAL ENCOUNTER (OUTPATIENT)
Facility: MEDICAL CENTER | Age: 20
End: 2020-01-31
Attending: FAMILY MEDICINE
Payer: COMMERCIAL

## 2020-01-31 ENCOUNTER — OFFICE VISIT (OUTPATIENT)
Dept: URGENT CARE | Facility: PHYSICIAN GROUP | Age: 20
End: 2020-01-31
Payer: COMMERCIAL

## 2020-01-31 VITALS
BODY MASS INDEX: 24.16 KG/M2 | TEMPERATURE: 98.9 F | OXYGEN SATURATION: 97 % | HEIGHT: 65 IN | SYSTOLIC BLOOD PRESSURE: 118 MMHG | DIASTOLIC BLOOD PRESSURE: 78 MMHG | WEIGHT: 145 LBS | HEART RATE: 91 BPM | RESPIRATION RATE: 16 BRPM

## 2020-01-31 DIAGNOSIS — R31.9 URINARY TRACT INFECTION WITH HEMATURIA, SITE UNSPECIFIED: ICD-10-CM

## 2020-01-31 DIAGNOSIS — R30.9 PAINFUL URINATION: ICD-10-CM

## 2020-01-31 DIAGNOSIS — N39.0 URINARY TRACT INFECTION WITH HEMATURIA, SITE UNSPECIFIED: ICD-10-CM

## 2020-01-31 DIAGNOSIS — N76.0 ACUTE VAGINITIS: ICD-10-CM

## 2020-01-31 DIAGNOSIS — Z72.51 HIGH RISK HETEROSEXUAL BEHAVIOR: ICD-10-CM

## 2020-01-31 LAB
APPEARANCE UR: NORMAL
BILIRUB UR STRIP-MCNC: NEGATIVE MG/DL
COLOR UR AUTO: YELLOW
GLUCOSE UR STRIP.AUTO-MCNC: NEGATIVE MG/DL
INT CON NEG: NORMAL
INT CON POS: NORMAL
KETONES UR STRIP.AUTO-MCNC: NEGATIVE MG/DL
LEUKOCYTE ESTERASE UR QL STRIP.AUTO: NORMAL
NITRITE UR QL STRIP.AUTO: NEGATIVE
PH UR STRIP.AUTO: 6 [PH] (ref 5–8)
POC URINE PREGNANCY TEST: NEGATIVE
PROT UR QL STRIP: NEGATIVE MG/DL
RBC UR QL AUTO: NORMAL
SP GR UR STRIP.AUTO: 1.01
UROBILINOGEN UR STRIP-MCNC: 0.2 MG/DL

## 2020-01-31 PROCEDURE — 99203 OFFICE O/P NEW LOW 30 MIN: CPT | Performed by: FAMILY MEDICINE

## 2020-01-31 PROCEDURE — 81025 URINE PREGNANCY TEST: CPT | Performed by: FAMILY MEDICINE

## 2020-01-31 PROCEDURE — 87798 DETECT AGENT NOS DNA AMP: CPT

## 2020-01-31 PROCEDURE — 87591 N.GONORRHOEAE DNA AMP PROB: CPT

## 2020-01-31 PROCEDURE — 81002 URINALYSIS NONAUTO W/O SCOPE: CPT | Performed by: FAMILY MEDICINE

## 2020-01-31 PROCEDURE — 87491 CHLMYD TRACH DNA AMP PROBE: CPT

## 2020-01-31 RX ORDER — NITROFURANTOIN 25; 75 MG/1; MG/1
100 CAPSULE ORAL 2 TIMES DAILY
Qty: 10 CAP | Refills: 0 | Status: SHIPPED | OUTPATIENT
Start: 2020-01-31 | End: 2020-02-05

## 2020-01-31 ASSESSMENT — ENCOUNTER SYMPTOMS
EYE PAIN: 0
NAUSEA: 0
SHORTNESS OF BREATH: 0
MYALGIAS: 0
SORE THROAT: 0
FEVER: 0
VOMITING: 0
DIZZINESS: 0
CHILLS: 0
ABDOMINAL PAIN: 0

## 2020-01-31 NOTE — PROGRESS NOTES
"Subjective:   Dinora Green is a 19 y.o. female who presents for Vaginal Pain (itching, burning, red bumps on vaginal area, urinary frequency, x4 days )        19-year-old female presents the urgent care with a chief complaint of vaginal rash consisting of red itchy bumps for the past 4 days.  The patient has had unprotected intercourse penile vaginal the past 90 days.  The current partner denies known recent sexually transmitted infection or exposure.  Patient also complains of vaginal discharge and burning with urination with urinary frequency for the past 4 days.    Vaginal Pain   Pertinent negatives include no abdominal pain, chest pain, chills, fever, myalgias, nausea, rash, sore throat or vomiting.     Review of Systems   Constitutional: Negative for chills and fever.   HENT: Negative for sore throat.    Eyes: Negative for pain.   Respiratory: Negative for shortness of breath.    Cardiovascular: Negative for chest pain.   Gastrointestinal: Negative for abdominal pain, nausea and vomiting.   Genitourinary: Positive for dysuria, frequency and vaginal pain. Negative for hematuria.   Musculoskeletal: Negative for myalgias.   Skin: Negative for rash.   Neurological: Negative for dizziness.     No Known Allergies   Objective:   /78 (BP Location: Right arm, Patient Position: Sitting, BP Cuff Size: Adult)   Pulse 91   Temp 37.2 °C (98.9 °F) (Temporal)   Resp 16   Ht 1.651 m (5' 5\")   Wt 65.8 kg (145 lb)   SpO2 97%   BMI 24.13 kg/m²   Physical Exam  Vitals signs and nursing note reviewed. Exam conducted with a chaperone present.   Constitutional:       General: She is not in acute distress.     Appearance: She is well-developed.   HENT:      Head: Normocephalic and atraumatic.      Right Ear: External ear normal.      Left Ear: External ear normal.      Nose: Nose normal.      Mouth/Throat:      Mouth: Mucous membranes are moist.   Eyes:      Conjunctiva/sclera: Conjunctivae normal.      Pupils: " Pupils are equal, round, and reactive to light.   Cardiovascular:      Rate and Rhythm: Normal rate and regular rhythm.      Heart sounds: No murmur.   Pulmonary:      Effort: Pulmonary effort is normal. No respiratory distress.      Breath sounds: Normal breath sounds.   Abdominal:      General: There is no distension.      Palpations: Abdomen is soft.      Tenderness: There is no tenderness. There is no right CVA tenderness or left CVA tenderness.   Genitourinary:     Labia:         Right: Rash present. No tenderness or lesion.         Left: No rash, tenderness or lesion.       Vagina: No vaginal discharge or tenderness.       Musculoskeletal: Normal range of motion.   Skin:     General: Skin is warm and dry.   Neurological:      General: No focal deficit present.      Mental Status: She is alert and oriented to person, place, and time. Mental status is at baseline.      Gait: Gait (gait at baseline) normal.   Psychiatric:         Judgment: Judgment normal.     Urinalysis: Glucose negative bili negative ketone negative specific gravity 1.015 blood small protein negative nitrate negative leukocyte Estrace large    Urina BHCG: negative          Assessment/Plan:   1. Urinary tract infection with hematuria, site unspecified  - nitrofurantoin monohyd macro (MACROBID) 100 MG Cap; Take 1 Cap by mouth 2 times a day for 5 days.  Dispense: 10 Cap; Refill: 0    2. Acute vaginitis  - CHLAMYDIA/GC PCR URINE OR SWAB; Future  - VAGINAL PATHOGENS DNA PANEL; Future  - Viral Culture, Rapid, Lesion    3. High risk heterosexual behavior  - CHLAMYDIA/GC PCR URINE OR SWAB; Future  - VAGINAL PATHOGENS DNA PANEL; Future  - Viral Culture, Rapid, Lesion      Discussed close monitoring, return precautions, and supportive measures including maintaining adequate fluid hydration and caloric intake, relative rest and OTC symptom management including acetaminophen as needed for pain and/or fever.    Differential diagnosis, natural history,  supportive care, and indications for immediate follow-up discussed.     Advised the patient to follow-up with the primary care physician for recheck, reevaluation, and consideration of further management.

## 2020-01-31 NOTE — PATIENT INSTRUCTIONS
Cervicitis  Cervicitis is when the cervix gets irritated and swollen. Your cervix is the lower end of your uterus.  Follow these instructions at home:  · Do not have sex until your doctor says it is okay.  · Take over-the-counter and prescription medicines only as told by your doctor.  · If you were prescribed an antibiotic medicine, take it as told by your doctor. Do not stop taking it even if you start to feel better.  · Keep all follow-up visits as told by your doctor. This is important.  Contact a doctor if:  · Your symptoms come back after treatment.  · Your symptoms get worse after treatment.  · You have a fever.  · You feel tired (fatigued).  · Your belly (abdomen) hurts.  · You feel like you are going to throw up (are nauseous).  · You throw up (vomit).  · You have watery poop (diarrhea).  · Your back hurts.  Get help right away if:  · You have very bad pain in your belly, and medicine does not help it.  · You cannot pee (urinate).  Summary  · Cervicitis is when the cervix gets irritated and swollen.  · Do not have sex until your doctor says it is okay.  · If you need to take an antibiotic, do not stop taking even if you start to feel better. Take medicines only as told by your doctor.  This information is not intended to replace advice given to you by your health care provider. Make sure you discuss any questions you have with your health care provider.  Document Released: 09/26/2009 Document Revised: 09/03/2017 Document Reviewed: 09/03/2017  [employer] Interactive Patient Education © 2017 [employer]  Vaginitis  Vaginitis is an inflammation of the vagina. It can happen when the normal bacteria and yeast in the vagina grow too much. There are different types. Treatment will depend on the type you have.  Follow these instructions at home:  · Take all medicines as told by your doctor.  · Keep your vagina area clean and dry. Avoid soap. Rinse the area with water.  · Avoid washing and cleaning out the vagina  (douching).  · Do not use tampons or have sex (intercourse) until your treatment is done.  · Wipe from front to back after going to the restroom.  · Wear cotton underwear.  · Avoid wearing underwear while you sleep until your vaginitis is gone.  · Avoid tight pants. Avoid underwear or nylons without a cotton panel.  · Take off wet clothing (such as a bathing suit) as soon as you can.  · Use mild, unscented products. Avoid fabric softeners and scented:  ¨ Feminine sprays.  ¨ Laundry detergents.  ¨ Tampons.  ¨ Soaps or bubble baths.  · Practice safe sex and use condoms.  Get help right away if:  · You have belly (abdominal) pain.  · You have a fever or lasting symptoms for more than 2-3 days.  · You have a fever and your symptoms suddenly get worse.  This information is not intended to replace advice given to you by your health care provider. Make sure you discuss any questions you have with your health care provider.  Document Released: 03/16/2010 Document Revised: 05/25/2017 Document Reviewed: 05/30/2013  [employer] Interactive Patient Education © 2017 [employer]

## 2020-02-01 DIAGNOSIS — Z72.51 HIGH RISK HETEROSEXUAL BEHAVIOR: ICD-10-CM

## 2020-02-01 DIAGNOSIS — N76.0 ACUTE VAGINITIS: ICD-10-CM

## 2020-02-01 PROCEDURE — 87798 DETECT AGENT NOS DNA AMP: CPT

## 2020-02-03 LAB
C TRACH DNA SPEC QL NAA+PROBE: NEGATIVE
N GONORRHOEA DNA SPEC QL NAA+PROBE: NEGATIVE
SPECIMEN SOURCE: NORMAL

## 2020-02-05 LAB
SPECIMEN SOURCE: NORMAL
VZV DNA SPEC QL NAA+PROBE: NOT DETECTED

## 2020-02-07 ENCOUNTER — TELEPHONE (OUTPATIENT)
Dept: URGENT CARE | Facility: PHYSICIAN GROUP | Age: 20
End: 2020-02-07

## 2020-02-07 NOTE — RESULT ENCOUNTER NOTE
Please call patient and let them know the viral cultures demonstrated no evidence of herpes.  Advised to follow-up with primary care provider for any persistent or worsening symptoms.

## 2020-02-07 NOTE — TELEPHONE ENCOUNTER
----- Message from Eyad Jean M.D. sent at 2/7/2020  8:32 AM PST -----  Please call patient and let them know the viral cultures demonstrated no evidence of herpes.  Advised to follow-up with primary care provider for any persistent or worsening symptoms.

## 2020-02-07 NOTE — TELEPHONE ENCOUNTER
Eyad Jean M.D.  [facility]             Please call patient and let them know the viral cultures demonstrated no evidence of herpes.  Advised to follow-up with primary care provider for any persistent or worsening symptoms.

## 2022-03-29 ENCOUNTER — OFFICE VISIT (OUTPATIENT)
Dept: URGENT CARE | Facility: PHYSICIAN GROUP | Age: 22
End: 2022-03-29
Payer: COMMERCIAL

## 2022-03-29 VITALS
SYSTOLIC BLOOD PRESSURE: 116 MMHG | TEMPERATURE: 98 F | HEART RATE: 76 BPM | DIASTOLIC BLOOD PRESSURE: 72 MMHG | RESPIRATION RATE: 17 BRPM | OXYGEN SATURATION: 98 % | HEIGHT: 66 IN | WEIGHT: 153 LBS | BODY MASS INDEX: 24.59 KG/M2

## 2022-03-29 DIAGNOSIS — H92.01 RIGHT EAR PAIN: ICD-10-CM

## 2022-03-29 DIAGNOSIS — H93.8X1 EAR FULLNESS, RIGHT: ICD-10-CM

## 2022-03-29 PROCEDURE — 99213 OFFICE O/P EST LOW 20 MIN: CPT | Performed by: STUDENT IN AN ORGANIZED HEALTH CARE EDUCATION/TRAINING PROGRAM

## 2022-03-29 NOTE — PROGRESS NOTES
"Subjective:   Dinora Green is a 22 y.o. female who presents for Ear Pain (Right side ear pain x 2 weeks hurts to sleep on it )      HPI:  Pleasant 22-year-old female presents the clinic with 2 weeks of intermittent right ear pain.  She states that the pain feels more like an ache.  She states that it comes on randomly and she has not noticed any triggers to why it happens.  She notices it most at night when laying on her right ear.  Patient does not have a history of TMJ.  She states that she does not grind or clench her teeth.  She does not have pain with chewing or biting down.  Patient denies fever, chills, rash, tinnitus, hearing loss, ear discharge, sinus pain/pressure, sore throat, chest pain, shortness of breath, sputum production, cough, nausea, vomiting, heartburn, dizziness, or headache.  Patient denies any recent water getting in her ear or swimming.  Patient denies any foreign body or trauma to the ear.  No recent piercings.        Medications:    • fluticasone    Allergies: Patient has no known allergies.    Problem List: Dinora Green does not have a problem list on file.    Surgical History:  No past surgical history on file.    Past Social Hx: Dinora Green  reports that she has never smoked. She has never used smokeless tobacco.     Past Family Hx:  Dinora Green family history is not on file.     Problem list, medications, and allergies reviewed by myself today in Epic.     Objective:     /72 (BP Location: Left arm, Patient Position: Sitting, BP Cuff Size: Adult)   Pulse 76   Temp 36.7 °C (98 °F) (Temporal)   Resp 17   Ht 1.676 m (5' 6\")   Wt 69.4 kg (153 lb)   SpO2 98%   BMI 24.69 kg/m²     Physical Exam  Vitals reviewed.   Constitutional:       General: She is not in acute distress.     Appearance: Normal appearance.   HENT:      Head: Normocephalic.      Right Ear: Hearing, tympanic membrane, ear canal and external ear normal.      Left Ear: Hearing, " tympanic membrane, ear canal and external ear normal.      Ears:      Comments: Patient denies tragal tenderness.  No tenderness to the pre or postauricular areas.  No mastoid tenderness.  No pain while opening the jaw or moving the jaw side to side.  Patient denies any pain at this time.  Patient states that her ears do not pop when she swallows.     Nose: Nose normal.      Mouth/Throat:      Mouth: Mucous membranes are moist.   Eyes:      Conjunctiva/sclera: Conjunctivae normal.      Pupils: Pupils are equal, round, and reactive to light.   Cardiovascular:      Rate and Rhythm: Normal rate and regular rhythm.      Pulses: Normal pulses.      Heart sounds: Normal heart sounds. No murmur heard.  Pulmonary:      Effort: Pulmonary effort is normal. No respiratory distress.      Breath sounds: Normal breath sounds. No stridor. No wheezing, rhonchi or rales.   Musculoskeletal:      Cervical back: Normal range of motion.   Lymphadenopathy:      Cervical: No cervical adenopathy.   Skin:     General: Skin is warm and dry.      Capillary Refill: Capillary refill takes less than 2 seconds.      Findings: No erythema, lesion or rash.   Neurological:      General: No focal deficit present.      Mental Status: She is alert and oriented to person, place, and time.         Assessment/Plan:     Diagnosis and associated orders:     1. Right ear pain     2. Ear fullness, right        Comments/MDM:     • Patient presents with 2 weeks of intermittent pain of the right ear but is worse when she lays on it.  Patient has not identified any triggers to why this happens and has no history of TMJ.  Patient states that she is not having any pain right now but does mention that her ears do not pop when she swallows patient has no complaint of decreased hearing in either ear..  • Patient's physical exam is normal and shows no infection of the inner ear or canal.  No signs of swelling or infection around the ear and no tenderness to palpation  around the ear.  • Discussed with patient that her ear fullness on the right side could possibly be due to eustachian tube dysfunction.  Patient was advised to use decongestions such as Mucinex, nasal saline spray, and Flonase daily.  Patient should monitor her symptoms to determine if this is helping.  Instructed patient to monitor when her symptoms occur and what she is doing at the time that her pain happens.  • Patient will return if symptoms do not improve or become worse.  • ER precautions given.         Differential diagnosis, natural history, supportive care, and indications for immediate follow-up discussed.    Advised the patient to follow-up with the primary care physician for recheck, reevaluation, and consideration of further management.    Please note that this dictation was created using voice recognition software. I have made a reasonable attempt to correct obvious errors, but I expect that there are errors of grammar and possibly content that I did not discover before finalizing the note.    Electronically signed by Smooth Ewing PA-C.

## 2023-01-13 ENCOUNTER — OFFICE VISIT (OUTPATIENT)
Dept: URGENT CARE | Facility: CLINIC | Age: 23
End: 2023-01-13
Payer: COMMERCIAL

## 2023-01-13 VITALS
RESPIRATION RATE: 16 BRPM | DIASTOLIC BLOOD PRESSURE: 74 MMHG | OXYGEN SATURATION: 96 % | SYSTOLIC BLOOD PRESSURE: 112 MMHG | HEIGHT: 66 IN | WEIGHT: 148 LBS | TEMPERATURE: 98.4 F | HEART RATE: 76 BPM | BODY MASS INDEX: 23.78 KG/M2

## 2023-01-13 DIAGNOSIS — L30.9 DERMATITIS: ICD-10-CM

## 2023-01-13 PROCEDURE — 99213 OFFICE O/P EST LOW 20 MIN: CPT | Performed by: FAMILY MEDICINE

## 2023-01-13 RX ORDER — HYDROCORTISONE CREAM 1% 10 MG/G
1 CREAM TOPICAL DAILY
Qty: 45 G | Refills: 0 | Status: SHIPPED | OUTPATIENT
Start: 2023-01-13 | End: 2023-01-20

## 2023-01-13 ASSESSMENT — ENCOUNTER SYMPTOMS: CONSTITUTIONAL NEGATIVE: 1

## 2023-01-13 NOTE — PROGRESS NOTES
"Subjective:   Dinora Green is a 22 y.o. female who presents for Rash (X 3 weeks, rash on LT side of neck, now is spreading to LT arm and leg. Itchy. )      The rash on her ams are seborrheic keratosis, where as the one on her neck is a contact dermatitis.    Rash      Review of Systems   Constitutional: Negative.    Skin:  Positive for rash.     Medications, Allergies, and current problem list reviewed today in Epic.     Objective:     /74   Pulse 76   Temp 36.9 °C (98.4 °F) (Temporal)   Resp 16   Ht 1.676 m (5' 6\")   Wt 67.1 kg (148 lb)   SpO2 96%     Physical Exam  Vitals and nursing note reviewed.   Constitutional:       Appearance: Normal appearance.   Skin:     Findings: Rash present.   Neurological:      Mental Status: She is alert.       Assessment/Plan:     Diagnosis and associated orders:     1. Dermatitis  Hydrocortisone Acetate 1 % Cream         Comments/MDM:   See primary about cryo to the seb. keratosis           Differential diagnosis, natural history, supportive care, and indications for immediate follow-up discussed.    Advised the patient to follow-up with the primary care physician for recheck, reevaluation, and consideration of further management.    Please note that this dictation was created using voice recognition software. I have made a reasonable attempt to correct obvious errors, but I expect that there are errors of grammar and possibly content that I did not discover before finalizing the note.    This note was electronically signed by Jean Paul Paz M.D.  "

## 2023-04-22 SDOH — HEALTH STABILITY: PHYSICAL HEALTH: ON AVERAGE, HOW MANY DAYS PER WEEK DO YOU ENGAGE IN MODERATE TO STRENUOUS EXERCISE (LIKE A BRISK WALK)?: 3 DAYS

## 2023-04-22 SDOH — ECONOMIC STABILITY: HOUSING INSECURITY
IN THE LAST 12 MONTHS, WAS THERE A TIME WHEN YOU DID NOT HAVE A STEADY PLACE TO SLEEP OR SLEPT IN A SHELTER (INCLUDING NOW)?: NO

## 2023-04-22 SDOH — HEALTH STABILITY: MENTAL HEALTH
STRESS IS WHEN SOMEONE FEELS TENSE, NERVOUS, ANXIOUS, OR CAN'T SLEEP AT NIGHT BECAUSE THEIR MIND IS TROUBLED. HOW STRESSED ARE YOU?: NOT AT ALL

## 2023-04-22 SDOH — ECONOMIC STABILITY: TRANSPORTATION INSECURITY
IN THE PAST 12 MONTHS, HAS LACK OF TRANSPORTATION KEPT YOU FROM MEETINGS, WORK, OR FROM GETTING THINGS NEEDED FOR DAILY LIVING?: NO

## 2023-04-22 SDOH — ECONOMIC STABILITY: FOOD INSECURITY: WITHIN THE PAST 12 MONTHS, THE FOOD YOU BOUGHT JUST DIDN'T LAST AND YOU DIDN'T HAVE MONEY TO GET MORE.: NEVER TRUE

## 2023-04-22 SDOH — ECONOMIC STABILITY: HOUSING INSECURITY: IN THE LAST 12 MONTHS, HOW MANY PLACES HAVE YOU LIVED?: 1

## 2023-04-22 SDOH — ECONOMIC STABILITY: INCOME INSECURITY: HOW HARD IS IT FOR YOU TO PAY FOR THE VERY BASICS LIKE FOOD, HOUSING, MEDICAL CARE, AND HEATING?: NOT HARD AT ALL

## 2023-04-22 SDOH — ECONOMIC STABILITY: INCOME INSECURITY: IN THE LAST 12 MONTHS, WAS THERE A TIME WHEN YOU WERE NOT ABLE TO PAY THE MORTGAGE OR RENT ON TIME?: NO

## 2023-04-22 SDOH — HEALTH STABILITY: PHYSICAL HEALTH: ON AVERAGE, HOW MANY MINUTES DO YOU ENGAGE IN EXERCISE AT THIS LEVEL?: 60 MIN

## 2023-04-22 SDOH — ECONOMIC STABILITY: TRANSPORTATION INSECURITY
IN THE PAST 12 MONTHS, HAS THE LACK OF TRANSPORTATION KEPT YOU FROM MEDICAL APPOINTMENTS OR FROM GETTING MEDICATIONS?: NO

## 2023-04-22 SDOH — ECONOMIC STABILITY: FOOD INSECURITY: WITHIN THE PAST 12 MONTHS, YOU WORRIED THAT YOUR FOOD WOULD RUN OUT BEFORE YOU GOT MONEY TO BUY MORE.: SOMETIMES TRUE

## 2023-04-22 SDOH — ECONOMIC STABILITY: TRANSPORTATION INSECURITY
IN THE PAST 12 MONTHS, HAS LACK OF RELIABLE TRANSPORTATION KEPT YOU FROM MEDICAL APPOINTMENTS, MEETINGS, WORK OR FROM GETTING THINGS NEEDED FOR DAILY LIVING?: NO

## 2023-04-22 ASSESSMENT — LIFESTYLE VARIABLES
HOW OFTEN DO YOU HAVE SIX OR MORE DRINKS ON ONE OCCASION: NEVER
AUDIT-C TOTAL SCORE: 2
HOW OFTEN DO YOU HAVE A DRINK CONTAINING ALCOHOL: MONTHLY OR LESS
HOW MANY STANDARD DRINKS CONTAINING ALCOHOL DO YOU HAVE ON A TYPICAL DAY: 3 OR 4
SKIP TO QUESTIONS 9-10: 0

## 2023-04-22 ASSESSMENT — SOCIAL DETERMINANTS OF HEALTH (SDOH)
WITHIN THE PAST 12 MONTHS, YOU WORRIED THAT YOUR FOOD WOULD RUN OUT BEFORE YOU GOT THE MONEY TO BUY MORE: SOMETIMES TRUE
DO YOU BELONG TO ANY CLUBS OR ORGANIZATIONS SUCH AS CHURCH GROUPS UNIONS, FRATERNAL OR ATHLETIC GROUPS, OR SCHOOL GROUPS?: NO
HOW HARD IS IT FOR YOU TO PAY FOR THE VERY BASICS LIKE FOOD, HOUSING, MEDICAL CARE, AND HEATING?: NOT HARD AT ALL
HOW OFTEN DO YOU ATTENT MEETINGS OF THE CLUB OR ORGANIZATION YOU BELONG TO?: NEVER
IN A TYPICAL WEEK, HOW MANY TIMES DO YOU TALK ON THE PHONE WITH FAMILY, FRIENDS, OR NEIGHBORS?: MORE THAN THREE TIMES A WEEK
HOW OFTEN DO YOU ATTEND CHURCH OR RELIGIOUS SERVICES?: MORE THAN 4 TIMES PER YEAR
HOW OFTEN DO YOU GET TOGETHER WITH FRIENDS OR RELATIVES?: ONCE A WEEK
HOW MANY DRINKS CONTAINING ALCOHOL DO YOU HAVE ON A TYPICAL DAY WHEN YOU ARE DRINKING: 3 OR 4
HOW OFTEN DO YOU ATTENT MEETINGS OF THE CLUB OR ORGANIZATION YOU BELONG TO?: NEVER
HOW OFTEN DO YOU ATTEND CHURCH OR RELIGIOUS SERVICES?: MORE THAN 4 TIMES PER YEAR
HOW OFTEN DO YOU HAVE SIX OR MORE DRINKS ON ONE OCCASION: NEVER
DO YOU BELONG TO ANY CLUBS OR ORGANIZATIONS SUCH AS CHURCH GROUPS UNIONS, FRATERNAL OR ATHLETIC GROUPS, OR SCHOOL GROUPS?: NO
ARE YOU MARRIED, WIDOWED, DIVORCED, SEPARATED, NEVER MARRIED, OR LIVING WITH A PARTNER?: NEVER MARRIED
IN A TYPICAL WEEK, HOW MANY TIMES DO YOU TALK ON THE PHONE WITH FAMILY, FRIENDS, OR NEIGHBORS?: MORE THAN THREE TIMES A WEEK
HOW OFTEN DO YOU HAVE A DRINK CONTAINING ALCOHOL: MONTHLY OR LESS
HOW OFTEN DO YOU GET TOGETHER WITH FRIENDS OR RELATIVES?: ONCE A WEEK
ARE YOU MARRIED, WIDOWED, DIVORCED, SEPARATED, NEVER MARRIED, OR LIVING WITH A PARTNER?: NEVER MARRIED

## 2023-04-25 ENCOUNTER — OFFICE VISIT (OUTPATIENT)
Dept: MEDICAL GROUP | Facility: MEDICAL CENTER | Age: 23
End: 2023-04-25
Payer: COMMERCIAL

## 2023-04-25 VITALS
OXYGEN SATURATION: 98 % | HEART RATE: 62 BPM | DIASTOLIC BLOOD PRESSURE: 72 MMHG | HEIGHT: 66 IN | SYSTOLIC BLOOD PRESSURE: 102 MMHG | BODY MASS INDEX: 23.78 KG/M2 | RESPIRATION RATE: 16 BRPM | WEIGHT: 148 LBS | TEMPERATURE: 97.6 F

## 2023-04-25 DIAGNOSIS — R53.82 CHRONIC FATIGUE: ICD-10-CM

## 2023-04-25 DIAGNOSIS — Z00.00 WELL ADULT EXAM: ICD-10-CM

## 2023-04-25 DIAGNOSIS — N92.0 MENORRHAGIA WITH REGULAR CYCLE: ICD-10-CM

## 2023-04-25 DIAGNOSIS — L50.9 URTICARIA: ICD-10-CM

## 2023-04-25 DIAGNOSIS — Z11.59 NEED FOR HEPATITIS C SCREENING TEST: ICD-10-CM

## 2023-04-25 DIAGNOSIS — Z83.3 FAMILY HISTORY OF DIABETES MELLITUS: ICD-10-CM

## 2023-04-25 PROCEDURE — 99204 OFFICE O/P NEW MOD 45 MIN: CPT | Performed by: INTERNAL MEDICINE

## 2023-04-25 ASSESSMENT — PATIENT HEALTH QUESTIONNAIRE - PHQ9: CLINICAL INTERPRETATION OF PHQ2 SCORE: 0

## 2023-04-25 NOTE — PROGRESS NOTES
New Patient to Establish      CC: Fatigue, hives    HPI:   Dinora is a 23 y.o. female who came into clinic for above.    She came from night shift today. She works night shifts 3 days/week.   She has been feeling tired for the past 3 to 4 months.  She has been sleeping 9 hours/day. She denies numbness, shortness of breath.  She has good exercise tolerance /strength training tolerance which she does on her off days. She lifts heavy stuff at work.   She was reported infrequent snoring.  Her menstrual cycles are regular but heavy in the first 3 days, total 5 days.  Dysmenorrhea in the first 3 days which is relieved by ibuprofen 400 mg twice daily.    She was having hives around her neck daily for 3 months which is now gone.  She did not notice any trigger or new products use at that time.    She would like to get screened for diabetes as her parents have diabetes.    ROS:   As above    Patient Active Problem List    Diagnosis Date Noted    Chronic fatigue 04/25/2023    Family history of diabetes mellitus 04/25/2023       History reviewed. No pertinent past medical history.    Current Outpatient Medications   Medication Sig Dispense Refill    fluticasone (FLONASE) 50 MCG/ACT nasal spray Spray 1 Spray in nose every day. (Patient not taking: Reported on 1/31/2020) 16 g 0     No current facility-administered medications for this visit.       Allergies as of 04/25/2023    (No Known Allergies)       Social History     Socioeconomic History    Marital status: Single     Spouse name: Not on file    Number of children: Not on file    Years of education: Not on file    Highest education level: Some college, no degree   Occupational History    Not on file   Tobacco Use    Smoking status: Never    Smokeless tobacco: Never   Vaping Use    Vaping Use: Never used   Substance and Sexual Activity    Alcohol use: Not Currently    Drug use: Not Currently    Sexual activity: Yes     Partners: Male     Birth control/protection: Condom  "  Other Topics Concern    Not on file   Social History Narrative    Works at a [employer]     Social Determinants of Health     Financial Resource Strain: Low Risk     Difficulty of Paying Living Expenses: Not hard at all   Food Insecurity: Food Insecurity Present    Worried About Running Out of Food in the Last Year: Sometimes true    Ran Out of Food in the Last Year: Never true   Transportation Needs: No Transportation Needs    Lack of Transportation (Medical): No    Lack of Transportation (Non-Medical): No   Physical Activity: Sufficiently Active    Days of Exercise per Week: 3 days    Minutes of Exercise per Session: 60 min   Stress: No Stress Concern Present    Feeling of Stress : Not at all   Social Connections: Moderately Isolated    Frequency of Communication with Friends and Family: More than three times a week    Frequency of Social Gatherings with Friends and Family: Once a week    Attends [employer] Services: More than 4 times per year    Active Member of Clubs or Organizations: No    Attends Club or Organization Meetings: Never    Marital Status: Never    Intimate Partner Violence: Not on file   Housing Stability: Low Risk     Unable to Pay for Housing in the Last Year: No    Number of Places Lived in the Last Year: 1    Unstable Housing in the Last Year: No       Family History   Problem Relation Age of Onset    Diabetes Mother     Thyroid Mother     Diabetes Father     Cancer Maternal Grandfather        History reviewed. No pertinent surgical history.      /72 (Patient Position: Sitting)   Pulse 62   Temp 36.4 °C (97.6 °F) (Temporal)   Resp 16   Ht 1.676 m (5' 6\")   Wt 67.1 kg (148 lb)   LMP 04/21/2023 (Exact Date) Comment: Very heavy and painful first 2-3 days  SpO2 98%   BMI 23.89 kg/m²     Physical Exam  General: Alert and oriented, No apparent distress.  Throat: Clear no erythema or exudates noted.  Neck: Supple. No cervical or supraclavicular lymphadenopathy noted. Thyroid not " enlarged.  Lungs: Clear to auscultation bilaterally without any wheezing, crepitations.  Cardiovascular: Regular rate and rhythm. No murmurs, rubs or gallops.  Abdomen: Bowel sound +, soft, non tender, no rebound or guarding, no palpable organomegaly  Extremities: No edema.  Skin: No rash or suspicious skin lesions noted.        Assessment and Plan    1. Chronic fatigue  Unclear etiology.  Possibly iron deficiency due to menorrhagia.  Will rule out other common causes with labs.  - TSH WITH REFLEX TO FT4; Future  - VITAMIN D,25 HYDROXY (DEFICIENCY); Future  - VITAMIN B12; Future    2. Urticaria  She would like to see allergy specialist.  We will check CBC, CMP and inflammatory markers.  - Referral to Allergy  - Sed Rate; Future  - CRP QUANTITIVE (NON-CARDIAC); Future    3. Family history of diabetes mellitus  - HEMOGLOBIN A1C; Future    4. Menorrhagia with regular cycle  If her chronic fatigue is indeed from iron loss related to menorrhagia, discussed options of staying on iron supplement as long as she has menorrhagia vs using hormonal / IUD contraception to control menorrhagia.  She does have gynecologist.  Her last Pap smear was in 2021 which was normal.  - FERRITIN; Future  - IRON/TOTAL IRON BIND; Future    5. Need for hepatitis C screening test  - HEP C VIRUS ANTIBODY; Future    6. Well adult exam  - CBC WITH DIFFERENTIAL; Future  - Comp Metabolic Panel; Future  - Lipid Profile; Future      Followup: based on labs. If labs are okay apart from vitamin deficiencies, Return in about 1 year (around 4/25/2024) for Annual wellness visit, sooner for acute medical issues.      Signed by: Kaylee Cowan M.D.

## 2023-04-27 LAB
25(OH)D3+25(OH)D2 SERPL-MCNC: 19.5 NG/ML (ref 30–100)
ALBUMIN SERPL-MCNC: 5 G/DL (ref 3.9–5)
ALBUMIN/GLOB SERPL: 1.6 {RATIO} (ref 1.2–2.2)
ALP SERPL-CCNC: 80 IU/L (ref 44–121)
ALT SERPL-CCNC: 12 IU/L (ref 0–32)
AST SERPL-CCNC: 18 IU/L (ref 0–40)
BASOPHILS # BLD AUTO: 0.1 X10E3/UL (ref 0–0.2)
BASOPHILS NFR BLD AUTO: 1 %
BILIRUB SERPL-MCNC: 0.5 MG/DL (ref 0–1.2)
BUN SERPL-MCNC: 19 MG/DL (ref 6–20)
BUN/CREAT SERPL: 25 (ref 9–23)
CALCIUM SERPL-MCNC: 10 MG/DL (ref 8.7–10.2)
CHLORIDE SERPL-SCNC: 102 MMOL/L (ref 96–106)
CHOLEST SERPL-MCNC: 179 MG/DL (ref 100–199)
CO2 SERPL-SCNC: 22 MMOL/L (ref 20–29)
CREAT SERPL-MCNC: 0.76 MG/DL (ref 0.57–1)
CRP SERPL-MCNC: <1 MG/L (ref 0–10)
EGFRCR SERPLBLD CKD-EPI 2021: 111 ML/MIN/1.73
EOSINOPHIL # BLD AUTO: 0.3 X10E3/UL (ref 0–0.4)
EOSINOPHIL NFR BLD AUTO: 5 %
ERYTHROCYTE [DISTWIDTH] IN BLOOD BY AUTOMATED COUNT: 12.1 % (ref 11.7–15.4)
ERYTHROCYTE [SEDIMENTATION RATE] IN BLOOD BY WESTERGREN METHOD: 16 MM/HR (ref 0–32)
FERRITIN SERPL-MCNC: 68 NG/ML (ref 15–150)
GLOBULIN SER CALC-MCNC: 3.1 G/DL (ref 1.5–4.5)
GLUCOSE SERPL-MCNC: 87 MG/DL (ref 70–99)
HBA1C MFR BLD: 5.3 % (ref 4.8–5.6)
HCT VFR BLD AUTO: 42.2 % (ref 34–46.6)
HCV IGG SERPL QL IA: NON REACTIVE
HDLC SERPL-MCNC: 55 MG/DL
HGB BLD-MCNC: 14.4 G/DL (ref 11.1–15.9)
IMM GRANULOCYTES # BLD AUTO: 0 X10E3/UL (ref 0–0.1)
IMM GRANULOCYTES NFR BLD AUTO: 0 %
IMMATURE CELLS  115398: NORMAL
IRON SATN MFR SERPL: 31 % (ref 15–55)
IRON SERPL-MCNC: 106 UG/DL (ref 27–159)
LABORATORY COMMENT REPORT: ABNORMAL
LDLC SERPL CALC-MCNC: 110 MG/DL (ref 0–99)
LYMPHOCYTES # BLD AUTO: 1.4 X10E3/UL (ref 0.7–3.1)
LYMPHOCYTES NFR BLD AUTO: 25 %
MCH RBC QN AUTO: 30.8 PG (ref 26.6–33)
MCHC RBC AUTO-ENTMCNC: 34.1 G/DL (ref 31.5–35.7)
MCV RBC AUTO: 90 FL (ref 79–97)
MONOCYTES # BLD AUTO: 0.4 X10E3/UL (ref 0.1–0.9)
MONOCYTES NFR BLD AUTO: 7 %
MORPHOLOGY BLD-IMP: NORMAL
NEUTROPHILS # BLD AUTO: 3.4 X10E3/UL (ref 1.4–7)
NEUTROPHILS NFR BLD AUTO: 62 %
NRBC BLD AUTO-RTO: NORMAL %
PLATELET # BLD AUTO: 265 X10E3/UL (ref 150–450)
POTASSIUM SERPL-SCNC: 4.2 MMOL/L (ref 3.5–5.2)
PROT SERPL-MCNC: 8.1 G/DL (ref 6–8.5)
RBC # BLD AUTO: 4.67 X10E6/UL (ref 3.77–5.28)
SODIUM SERPL-SCNC: 139 MMOL/L (ref 134–144)
TIBC SERPL-MCNC: 340 UG/DL (ref 250–450)
TRIGL SERPL-MCNC: 72 MG/DL (ref 0–149)
TSH SERPL DL<=0.005 MIU/L-ACNC: 2.52 UIU/ML (ref 0.45–4.5)
UIBC SERPL-MCNC: 234 UG/DL (ref 131–425)
VIT B12 SERPL-MCNC: 769 PG/ML (ref 232–1245)
VLDLC SERPL CALC-MCNC: 14 MG/DL (ref 5–40)
WBC # BLD AUTO: 5.4 X10E3/UL (ref 3.4–10.8)